# Patient Record
Sex: FEMALE
[De-identification: names, ages, dates, MRNs, and addresses within clinical notes are randomized per-mention and may not be internally consistent; named-entity substitution may affect disease eponyms.]

---

## 2017-03-15 ENCOUNTER — HOSPITAL ENCOUNTER (EMERGENCY)
Dept: HOSPITAL 14 - H.ER | Age: 47
Discharge: HOME | End: 2017-03-15
Payer: COMMERCIAL

## 2017-03-15 VITALS — RESPIRATION RATE: 20 BRPM

## 2017-03-15 VITALS — TEMPERATURE: 98.6 F

## 2017-03-15 VITALS — HEART RATE: 77 BPM

## 2017-03-15 VITALS — OXYGEN SATURATION: 99 %

## 2017-03-15 VITALS — DIASTOLIC BLOOD PRESSURE: 70 MMHG | SYSTOLIC BLOOD PRESSURE: 130 MMHG
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
## 2017-03-15 DIAGNOSIS — S82.892A: Primary | ICD-10-CM
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
## 2017-03-15 LAB
ALBUMIN/GLOB SERPL: 1.4 {RATIO} (ref 1–2.1)
ALP SERPL-CCNC: 54 U/L (ref 38–126)
ALT SERPL-CCNC: 21 U/L (ref 9–52)
APTT BLD: 24.5 SECONDS (ref 23.3–32.5)
AST SERPL-CCNC: 17 U/L (ref 14–36)
BASOPHILS # BLD AUTO: 0.1 K/UL (ref 0–0.2)
BASOPHILS NFR BLD: 0.8 % (ref 0–2)
BILIRUB SERPL-MCNC: 0.6 MG/DL (ref 0.2–1.3)
BUN SERPL-MCNC: 11 MG/DL (ref 7–17)
CALCIUM SERPL-MCNC: 9.2 MG/DL (ref 8.4–10.2)
CHLORIDE SERPL-SCNC: 106 MMOL/L (ref 98–107)
CO2 SERPL-SCNC: 19 MMOL/L (ref 22–30)
EOSINOPHIL # BLD AUTO: 0.8 K/UL (ref 0–0.7)
EOSINOPHIL NFR BLD: 5.3 % (ref 0–4)
ERYTHROCYTE [DISTWIDTH] IN BLOOD BY AUTOMATED COUNT: 11.8 % (ref 11.5–14.5)
GLOBULIN SER-MCNC: 3.1 GM/DL (ref 2.2–3.9)
GLUCOSE SERPL-MCNC: 155 MG/DL (ref 65–105)
HCT VFR BLD CALC: 38.3 % (ref 34–47)
LYMPHOCYTES # BLD AUTO: 2.8 K/UL (ref 1–4.3)
LYMPHOCYTES NFR BLD AUTO: 19.6 % (ref 20–40)
MCH RBC QN AUTO: 33 PG (ref 27–31)
MCHC RBC AUTO-ENTMCNC: 33.7 G/DL (ref 33–37)
MCV RBC AUTO: 97.9 FL (ref 81–99)
MONOCYTES # BLD: 0.8 K/UL (ref 0–0.8)
MONOCYTES NFR BLD: 5.3 % (ref 0–10)
NEUTROPHILS # BLD: 9.9 K/UL (ref 1.8–7)
NEUTROPHILS NFR BLD AUTO: 69 % (ref 50–75)
NRBC BLD AUTO-RTO: 0.1 % (ref 0–0)
PLATELET # BLD: 289 K/UL (ref 130–400)
PMV BLD AUTO: 8.6 FL (ref 7.2–11.7)
POTASSIUM SERPL-SCNC: 3.8 MMOL/L (ref 3.6–5)
PROT SERPL-MCNC: 7.3 G/DL (ref 6.3–8.2)
SODIUM SERPL-SCNC: 141 MMOL/L (ref 132–148)
WBC # BLD AUTO: 14.4 K/UL (ref 4.8–10.8)

## 2017-03-15 PROCEDURE — 96374 THER/PROPH/DIAG INJ IV PUSH: CPT

## 2017-03-15 PROCEDURE — 84703 CHORIONIC GONADOTROPIN ASSAY: CPT

## 2017-03-15 PROCEDURE — 80053 COMPREHEN METABOLIC PANEL: CPT

## 2017-03-15 PROCEDURE — 85610 PROTHROMBIN TIME: CPT

## 2017-03-15 PROCEDURE — 93005 ELECTROCARDIOGRAM TRACING: CPT

## 2017-03-15 PROCEDURE — 73630 X-RAY EXAM OF FOOT: CPT

## 2017-03-15 PROCEDURE — 73610 X-RAY EXAM OF ANKLE: CPT

## 2017-03-15 PROCEDURE — 99283 EMERGENCY DEPT VISIT LOW MDM: CPT

## 2017-03-15 PROCEDURE — 85730 THROMBOPLASTIN TIME PARTIAL: CPT

## 2017-03-15 PROCEDURE — 73600 X-RAY EXAM OF ANKLE: CPT

## 2017-03-15 PROCEDURE — 85025 COMPLETE CBC W/AUTO DIFF WBC: CPT

## 2017-03-15 PROCEDURE — 96375 TX/PRO/DX INJ NEW DRUG ADDON: CPT

## 2017-03-15 PROCEDURE — 73590 X-RAY EXAM OF LOWER LEG: CPT

## 2017-03-15 NOTE — RAD
PROCEDURE:  Left Foot Radiographs.



HISTORY:

fall  L foot ankle trauma  



COMPARISON:

None.



FINDINGS:



BONES:

No fracture identified in the foot.  Medial malleolar and distal 

fibular diaphyseal fractures are noted. 



JOINTS:

Normal. 



SOFT TISSUES:

Normal. 



OTHER FINDINGS:

None.



IMPRESSION:

Unremarkable examination of the left foot. Multiple fractures of the 

left ankle are noted. Please see report of left ankle radiographs.

## 2017-03-15 NOTE — RAD
Left ankle radiographs 



Comparison: Left ankle radiographs performed earlier the same day. 



Impression: Status post left ankle close reduction 



Findings: 



Images are obtained through a cast which obscures osseous detail.  

Trimalleolar fracture deformity is re-identified in anatomic 

alignment, improved since prior study. Soft tissue swelling. 



Impression: 



Interval cast. Trimalleolar fracture deformity appears in improved 

anatomic alignment since prior examination.

## 2017-03-15 NOTE — RAD
PROCEDURE:  Left Ankle Radiographs.



HISTORY:

fall  L foot ankle trauma  



COMPARISON:

None



FINDINGS:



BONES:

Displaced posterior malleolar fracture.  Displaced medial malleolar 

fracture.  Displaced oblique distal fibular fracture above the level 

of the plafond. Talar dome appears smooth. 



JOINTS:

Widened ankle mortise.



SOFT TISSUES:

Only mild soft tissue swelling noted. 



OTHER FINDINGS:

None.



IMPRESSION:

Fracture medial and posterior malleoli with displacement.  Oblique 

displaced distal fibular fracture.  Widened ankle mortise.

## 2017-03-15 NOTE — RAD
PROCEDURE:  Left Ankle Radiographs.



HISTORY:

closed reduction assesment  



COMPARISON:

None



FINDINGS:



BONES:

Status post close reduction left ankle fracture. The medial malleolus 

and distal fibular fragment both appear in closer anatomic alignment. 

. The posterior malleolar fragment appears in anatomic alignment.  

There is likely greater comminution of the distal tibia than is 

clearly evident on plain radiography. 



JOINTS:

The tibiotalar articulation appears grossly intact. The talar dome is 

smooth.



SOFT TISSUES:

Circumferential soft tissue swelling. 



OTHER FINDINGS:

None.



IMPRESSION:

Closedreduction oblique distal fibular diaphysis fracture, medial 

malleolar and posterior malleolar fractures. There is still some 

displacement of the medial malleolar fragment.  There is likely 

greater comminution of the distal tibia than is evident on plain 

radiography.

## 2017-03-15 NOTE — RAD
PROCEDURE:  Radiographs of the left tibia and fibula. 



HISTORY:

fall  L foot ankle trauma



COMPARISON:

None available.



TECHNIQUE:

Frontal and lateral views obtained. 



FINDINGS:



BONES:

Oblique displaced distal fibular fracture. Medial and posterior 

malleolar fractures as reported under radiography of left ankle.  No 

additional fracture identified.



JOINT SPACES:

Unremarkable.



OTHER FINDINGS:

None.



IMPRESSION:

Oblique mildly displaced distal fibular fracture.  Medial and 

posterior malleolar fractures.  Mildly displaced.

## 2017-03-15 NOTE — ED PDOC
Lower Extremity Pain/Injury


Time Seen by Provider: 03/15/17 09:32


Chief Complaint (Nursing): Lower Extremity Problem/Injury


Chief Complaint (Provider): left ankle pain


History Per: Patient, EMS


History/Exam Limitations: no limitations


Onset/Duration Of Symptoms: Hrs (<1)


Current Symptoms Are (Timing): Still Present


Severity: Severe


Additional Complaint(s): 


45yo female states was walking and slipped on ice, injuring left ankle/lower 

leg.  Denies head or neck trauma. Denies upper extremity, chest pain or back 

pain.  Only c/o pain to left ankle with limited active ROM and tingling to 

foot.  Partially immobilized ankle by EMS. Placed onto stretcher, deformity 

noted, ortho workup initiated with immediate ordering of analgesia for relief 

of pain.





Past Medical History


Reviewed: Historical Data, Nursing Documentation, Vital Signs


Vital Signs: 





 Last Vital Signs











Temp  96.8 F L  03/15/17 09:25


 


Pulse  76   03/15/17 09:25


 


Resp  18   03/15/17 09:25


 


BP  143/89   03/15/17 09:25


 


Pulse Ox  99   03/15/17 09:25














- Medical History


PMH: Asthma


Other PMH: breast cancer





- Surgical History


Other surgeries: mastectomy





- Family History


Family History: States: Unknown Family Hx





- Living Arrangements


Living Arrangements: With Family





- Social History


Drugs: Denies





- Home Medications


Home Medications: 


 Ambulatory Orders











 Medication  Instructions  Recorded


 


Albuterol Sulfate [Proair Hfa] 2 puff IH Q6H PRN 06/28/16


 


Fluticasone/Salmeterol 250/50 1 puff IH Q12H 03/15/17





[Advair Diskus 250/50]  


 


Naproxen [Naprosyn] 500 mg PO BID PRN #14 tablet 03/15/17


 


oxyCODONE/Acetaminophen [Percocet 1 ea PO Q6 PRN #12 tab 03/15/17





5/325 mg Tab]  


 


traMADol [Ultram] 50 mg PO TID PRN #12 tab 03/15/17














- Allergies


Allergies/Adverse Reactions: 


 Allergies











Allergy/AdvReac Type Severity Reaction Status Date / Time


 


cephalexin monohydrate Allergy  RASH Verified 03/15/17 09:29





[From Keflex]     


 


codeine Allergy  RASH Verified 03/15/17 09:29


 


Penicillins Allergy  RASH Verified 03/15/17 09:29


 


sulfamethoxazole Allergy  SYNCOPE Verified 03/15/17 09:29





[From Bactrim]     


 


trimethoprim [From Bactrim] Allergy  SYNCOPE Verified 03/15/17 09:29














Review of Systems


Constitutional: Negative for: Fever, Chills


Cardiovascular: Negative for: Chest Pain, Palpitations


Respiratory: Negative for: Cough, Shortness of Breath


Gastrointestinal: Negative for: Nausea, Vomiting


Genitourinary Female: Negative for: Dysuria, Frequency


Musculoskeletal: Positive for: Leg Pain, Foot Pain.  Negative for: Neck Pain, 

Shoulder Pain, Arm Pain, Back Pain, Hand Pain


Skin: Negative for: Rash, Lesions, Jaundice


Neurological: Negative for: Weakness, Numbness





Physical Exam





- Reviewed


Nursing Documentation Reviewed: Yes


Vital Signs Reviewed: Yes





- Physical Exam


Appears: Positive for: Non-toxic, Uncomfortable (painful distress)


Head Exam: Positive for: ATRAUMATIC, NORMAL INSPECTION, NORMOCEPHALIC


Skin: Positive for: Normal Color, Warm, DRY


Eye Exam: Positive for: Normal appearance, EOMI, PERRL.  Negative for: 

Periorbital swelling


ENT: Positive for: Normal ENT Inspection


Neck: Positive for: Normal, Painless ROM


Cardiovascular/Chest: Positive for: Regular Rate, Rhythm


Respiratory: Positive for: Normal Breath Sounds.  Negative for: Respiratory 

Distress


Gastrointestinal/Abdominal: Positive for: Normal Exam, Bowel Sounds, Soft


Back: Positive for: Normal Inspection


Extremity: Positive for: Swelling (L ankle +deformity and tenderness, edema, 

loss ROM active or passive, normal knee no femur/knee or prox fib tenderness), 

Other (neg hip or right leg tenderness)


Neurologic/Psych: Positive for: Alert, Oriented





- Laboratory Results


Result Diagrams: 


 03/15/17 10:20





 03/15/17 10:20





- ECG


ECG: Positive for: Interpreted By Me


ECG Rhythm: Positive for: Sinus Rhythm


Interpretation Of Abn EKG: performed pre-sedation


Rate: 77


O2 Sat by Pulse Oximetry: 99


Pulse Ox Interpretation: Normal





Medical Decision Making


Medical Decision Making: 


"Code ortho" initiated. Ice applied to ankle w stabilization and immobilization.


XRays ordered. Basic labwork initiated.  NPO.





1000a


XRays reveal trimal fracture with dislocation on my initial review.


Podiatry consulted. 





Labs unremarkable


States had normal CXR recently via PMD





Procedure note:  Reduction and procedural sedation


Indication: tri-mal fx with fib spiral fracture and displacement


Consent obtained for closed reduction under procedural sedation.


Risks benefits alternatives explained.


Time out performed. ID, site, allergy and NPO confirmed.


Using ketamine and propofol, excellent sedation obtained- SPO2 >96% throughout 

procedure, ETCO2 32-38 with excellent waveform.


Dr Nagel and podiatry team provided procedure, traction with real time XRay, 

improved alignment of fracture.


Cast applied by podiatry team, toes remain pink and mobile. 


Tolerated well. MD in room total 35minutes. 


Monitored ~90min post procedure with +urination, tolerating PO.


Pain medicine regimen explained (pt has taken percocet in past without allergy 

as recently as last year), elevate, hold NSAIDS 48hrs prior to surgery, now 

scheduled for Friday.


Followup Dr Bates.


Instructions for post deep sedation given. 








Disposition





- Clinical Impression


Clinical Impression: 


 Ankle fracture





- Patient ED Disposition


Is Patient to be Admitted: No


Counseled Patient/Family Regarding: Studies Performed, Diagnosis, Need For 

Followup, Rx Given





- Disposition


Referrals: 


Podiatry Clinic [Outside]


Disposition: Routine/Home


Disposition Time: 15:30


Condition: STABLE


Additional Instructions: 


Non-weight bearing to left ankle/foot.  Use crutches.  You will need surgery 

for this injury.  Call Dr Bates tomorrow for appointment soon to arrange.


Take pain medication as directed.  Recommend liquid diet tonight after 

sedation. Have family member stay with you tonight.  


Prescriptions: 


Naproxen [Naprosyn] 500 mg PO BID PRN #14 tablet


 PRN Reason: Pain, Moderate (4-7)


oxyCODONE/Acetaminophen [Percocet 5/325 mg Tab] 1 ea PO Q6 PRN #12 tab


 PRN Reason: Pain, Severe (8-10)


traMADol [Ultram] 50 mg PO TID PRN #12 tab


 PRN Reason: Pain, Moderate (4-7)


Instructions:  Ankle Fracture (ED), Cast Care (ED), Crutch Instructions (ED), 

Deep Sedation (ED), RICE Therapy (ED), Non Weight Bearing Activity (ED)





- POA


Present On Arrival: Falls Or Trauma

## 2017-03-16 NOTE — CARD
--------------- APPROVED REPORT --------------





EKG Measurement

Heart [id number]

FL [id number]

[id number]

[id number]

[id number]



<Conclusion>

Normal sinus rhythm

Possible Left atrial enlargement

Borderline ECG

## 2018-02-18 ENCOUNTER — HOSPITAL ENCOUNTER (EMERGENCY)
Dept: HOSPITAL 14 - H.ER | Age: 48
Discharge: HOME | End: 2018-02-18
Payer: COMMERCIAL

## 2018-02-18 VITALS — OXYGEN SATURATION: 96 %

## 2018-02-18 VITALS — HEART RATE: 98 BPM | DIASTOLIC BLOOD PRESSURE: 69 MMHG | RESPIRATION RATE: 18 BRPM | SYSTOLIC BLOOD PRESSURE: 119 MMHG

## 2018-02-18 VITALS — TEMPERATURE: 98.3 F

## 2018-02-18 DIAGNOSIS — J45.901: Primary | ICD-10-CM

## 2018-02-18 DIAGNOSIS — Z88.0: ICD-10-CM

## 2018-02-18 LAB
ALBUMIN SERPL-MCNC: 4.2 G/DL (ref 3.5–5)
ALBUMIN/GLOB SERPL: 1.3 {RATIO} (ref 1–2.1)
ALT SERPL-CCNC: 29 U/L (ref 9–52)
AST SERPL-CCNC: 28 U/L (ref 14–36)
BASOPHILS # BLD AUTO: 0.1 K/UL (ref 0–0.2)
BASOPHILS NFR BLD: 0.6 % (ref 0–2)
BUN SERPL-MCNC: 13 MG/DL (ref 7–17)
CALCIUM SERPL-MCNC: 9.3 MG/DL (ref 8.4–10.2)
EOSINOPHIL # BLD AUTO: 0.6 K/UL (ref 0–0.7)
EOSINOPHIL NFR BLD: 2.9 % (ref 0–4)
ERYTHROCYTE [DISTWIDTH] IN BLOOD BY AUTOMATED COUNT: 11.8 % (ref 11.5–14.5)
GFR NON-AFRICAN AMERICAN: > 60
HGB BLD-MCNC: 13.3 G/DL (ref 12–16)
LYMPHOCYTES # BLD AUTO: 1.2 K/UL (ref 1–4.3)
LYMPHOCYTES NFR BLD AUTO: 6.2 % (ref 20–40)
MCH RBC QN AUTO: 32.6 PG (ref 27–31)
MCHC RBC AUTO-ENTMCNC: 33.3 G/DL (ref 33–37)
MCV RBC AUTO: 98 FL (ref 81–99)
MONOCYTES # BLD: 0.4 K/UL (ref 0–0.8)
MONOCYTES NFR BLD: 1.9 % (ref 0–10)
NEUTROPHILS # BLD: 17.4 K/UL (ref 1.8–7)
NEUTROPHILS NFR BLD AUTO: 88.4 % (ref 50–75)
NRBC BLD AUTO-RTO: 0.1 % (ref 0–0)
PLATELET # BLD: 280 K/UL (ref 130–400)
PMV BLD AUTO: 8 FL (ref 7.2–11.7)
RBC # BLD AUTO: 4.08 MIL/UL (ref 3.8–5.2)
WBC # BLD AUTO: 19.7 K/UL (ref 4.8–10.8)

## 2018-02-18 NOTE — ED PDOC
HPI: SOB/CHF/COPD


Time Seen by Provider: 02/18/18 21:32


Chief Complaint (Nursing): Shortness Of Breath


Additional Complaint(s): 





48 yo female pmhx asthma brought in by EMS to [facility] ED w/ c/o wheezing and chest 

tightness since this AM. Pt reports she has URI symptoms ( cough and nasal 

congestion) for 1.5 weeks. Pt was seen by her PCP/Pulmonologist Dr. Mcgregor on 

2/15/18, was given prednisone taper and had negative CXR in the office. Pt 

reports she took prednisone 40 mg today at 5:45 pm ( first dose) and took 3 

nebulizer treatment since this morning. States her chest tightness and 

shortness of breath were not improving so see came to ED.  Denies chest pain, 

dizziness, fever, chills, GI or  symptoms. Denies any hx hospitalization or 

intubation due to asthma but had ED visit for asthma in the past. Quit smoking 

5 yrs ago( smoked 1/2 PPD X 15 yrs).








PMD/Pulmonologist: Dr. Mcgregor





  





Past Medical History


Vital Signs: 


 Last Vital Signs











Temp  98.3 F   02/18/18 20:45


 


Pulse  98 H  02/18/18 23:24


 


Resp  18   02/18/18 23:24


 


BP  119/69   02/18/18 23:24


 


Pulse Ox  95   02/18/18 23:24














- Medical History


PMH: Asthma





- Surgical History


Other surgeries: Ankle surgery





- Family History


Family History: States: No Known Family Hx





- Social History


Ex-Smoker (has not smoked in the last 12 months): Yes


Alcohol: None


Drugs: Denies





- Home Medications


Home Medications: 


 Ambulatory Orders











 Medication  Instructions  Recorded


 


Albuterol Sulfate [Proair Hfa] 2 puff IH Q6H PRN 06/28/16


 


Fluticasone/Salmeterol 250/50 1 puff IH Q12H 03/15/17





[Advair Diskus 250/50]  


 


Naproxen [Naprosyn] 500 mg PO BID PRN #14 tablet 03/15/17


 


oxyCODONE/Acetaminophen [Percocet 1 ea PO Q6 PRN #12 tab 03/15/17





5/325 mg Tab]  


 


traMADol [Ultram] 50 mg PO TID PRN #12 tab 03/15/17














- Allergies


Allergies/Adverse Reactions: 


 Allergies











Allergy/AdvReac Type Severity Reaction Status Date / Time


 


cephalexin monohydrate Allergy  RASH Verified 03/15/17 09:29





[From Keflex]     


 


codeine Allergy  RASH Verified 03/15/17 09:29


 


Penicillins Allergy  RASH Verified 03/15/17 09:29


 


sulfamethoxazole Allergy  SYNCOPE Verified 03/15/17 09:29





[From Bactrim]     


 


trimethoprim [From Bactrim] Allergy  SYNCOPE Verified 03/15/17 09:29














Review of Systems


Constitutional: Negative for: Fever, Chills


ENT: Negative for: Ear Pain, Throat Pain


Cardiovascular: Negative for: Chest Pain, Palpitations


Respiratory: Positive for: Cough, Shortness of Breath, Wheezing


Gastrointestinal: Negative for: Nausea, Vomiting, Abdominal Pain


Genitourinary Female: Negative for: Dysuria


Skin: Negative for: Rash


Neurological: Negative for: Headache, Dizziness





Physical Exam





- Physical Exam


Appears: Positive for: Non-toxic, In Acute Distress (in mild respiratory 

distress but able to talk in complete sentences. )


Head Exam: Positive for: ATRAUMATIC, NORMOCEPHALIC


Skin: Positive for: Normal Color.  Negative for: Diaphoresis


ENT: Positive for: Normal ENT Inspection


Neck: Positive for: Normal


Cardiovascular/Chest: Positive for: Tachycardia (slight tachycardia w/ regular 

rhythm. )


Respiratory: Positive for: Wheezing (diffuse wheezing B/L lung fields prominent 

in expiratory phase. ).  Negative for: Accessory Muscle Use, Crackles, Rales


Gastrointestinal/Abdominal: Positive for: Bowel Sounds, Soft.  Negative for: 

Tenderness


Neurologic/Psych: Positive for: Alert, Oriented





- Laboratory Results


Result Diagrams: 


 02/18/18 22:23





 02/18/18 22:23





- ECG


O2 Sat by Pulse Oximetry: 96





- Progress


ED Course And Treament: 





Assessment: 





48 yo female pmhx asthma brought in by EMS to [facility] ED with symptoms of asthma 

exacerbation.





Plan:





DuoNeb x 3 


Pt took 40 mg prednisone at 5:45 pm this evening at home.


Peak flow pre/post tx


CBC


CMP


Urine hCG.





If symptoms do not improved after 3 treatment, will consider mag sulfate.








Case d/w ED attending Dr. Coleman.





Re-evaluation at 11:00 pm





After three dueneb tx, pt feels significantly better. Pt's wheezing has 

improved but residual B/L expiratory wheezing remains. Pt declines to have any 

IV medication ( i.e. mag sulfate). Pt is advised to complete prednisone course 

as prescribed by her Pulmonologist. Pt is medically stable to discharge home. 

Advised to return to ED if symptoms worsens. Pt verbalizes the understanding 

and agrees with the plan. 








Case d/w ED attending Dr. Coleman














   





Disposition





- Clinical Impression


Clinical Impression: 


 Asthma exacerbation








- Disposition


Disposition Time: 23:36


Condition: IMPROVED


Instructions:  Asthma in Adults


Forms:  [facility] (English)

## 2018-02-19 LAB
EOSINOPHIL NFR BLD: 4 % (ref 0–7)
LYMPHOCYTE: 6 % (ref 20–50)
MONOCYTE: 0 % (ref 0–10)
NEUTROPHILS NFR BLD AUTO: 90 % (ref 42–75)
PLATELET # BLD EST: NORMAL 10*3/UL
TEARDROP CELLS: SLIGHT
TOTAL CELLS COUNTED BLD: 100

## 2018-04-12 ENCOUNTER — HOSPITAL ENCOUNTER (EMERGENCY)
Dept: HOSPITAL 14 - H.ER | Age: 48
Discharge: HOME | End: 2018-04-12
Payer: COMMERCIAL

## 2018-04-12 VITALS
SYSTOLIC BLOOD PRESSURE: 128 MMHG | OXYGEN SATURATION: 100 % | DIASTOLIC BLOOD PRESSURE: 78 MMHG | HEART RATE: 73 BPM | RESPIRATION RATE: 14 BRPM

## 2018-04-12 VITALS — TEMPERATURE: 97.6 F

## 2018-04-12 VITALS — BODY MASS INDEX: 20.7 KG/M2

## 2018-04-12 DIAGNOSIS — J45.909: ICD-10-CM

## 2018-04-12 DIAGNOSIS — R07.89: Primary | ICD-10-CM

## 2018-04-12 DIAGNOSIS — Z88.0: ICD-10-CM

## 2018-04-12 LAB
ALBUMIN SERPL-MCNC: 4.4 G/DL (ref 3.5–5)
ALBUMIN/GLOB SERPL: 1.4 {RATIO} (ref 1–2.1)
ALT SERPL-CCNC: 29 U/L (ref 9–52)
AST SERPL-CCNC: 19 U/L (ref 14–36)
BASOPHILS # BLD AUTO: 0.1 K/UL (ref 0–0.2)
BASOPHILS NFR BLD: 1 % (ref 0–2)
BUN SERPL-MCNC: 10 MG/DL (ref 7–17)
CALCIUM SERPL-MCNC: 9.9 MG/DL (ref 8.4–10.2)
EOSINOPHIL # BLD AUTO: 0.9 K/UL (ref 0–0.7)
EOSINOPHIL NFR BLD: 9 % (ref 0–4)
ERYTHROCYTE [DISTWIDTH] IN BLOOD BY AUTOMATED COUNT: 12.3 % (ref 11.5–14.5)
GFR NON-AFRICAN AMERICAN: > 60
HGB BLD-MCNC: 13.8 G/DL (ref 12–16)
LYMPHOCYTES # BLD AUTO: 2.1 K/UL (ref 1–4.3)
LYMPHOCYTES NFR BLD AUTO: 22 % (ref 20–40)
MCH RBC QN AUTO: 33 PG (ref 27–31)
MCHC RBC AUTO-ENTMCNC: 33.9 G/DL (ref 33–37)
MCV RBC AUTO: 97.4 FL (ref 81–99)
MONOCYTES # BLD: 0.6 K/UL (ref 0–0.8)
MONOCYTES NFR BLD: 5.8 % (ref 0–10)
NEUTROPHILS # BLD: 6 K/UL (ref 1.8–7)
NEUTROPHILS NFR BLD AUTO: 62.2 % (ref 50–75)
NRBC BLD AUTO-RTO: 0.1 % (ref 0–0)
PLATELET # BLD: 314 K/UL (ref 130–400)
PMV BLD AUTO: 7.8 FL (ref 7.2–11.7)
RBC # BLD AUTO: 4.2 MIL/UL (ref 3.8–5.2)
WBC # BLD AUTO: 9.6 K/UL (ref 4.8–10.8)

## 2018-04-12 NOTE — ED PDOC
HPI: Chest Pain


Time Seen by Provider: 04/12/18 10:10


History Per: Patient


Onset/Duration Of Symptoms: Hrs (2)


Current Symptoms Are (Timing): Still Present


Severity: Moderate


Pain Scale Rating Of: 3


Quality: Sharp


Associated Symptoms: Diaphoresis


Modifying Factors: None


Exacerbating Factors: None


Alleviating Factors: None


Additional Complaint(s): 





Sharp left sided chest pain radiating to neck and left shoulder since this AM. 

Denies SOB. No weakness or parasthesias. Denies cough or fever. No leg or calf 

pain





Past Medical History


Vital Signs: 


 Last Vital Signs











Temp  97.6 F   04/12/18 10:11


 


Pulse  76   04/12/18 10:54


 


Resp  16   04/12/18 10:11


 


BP  153/88 H  04/12/18 10:11


 


Pulse Ox  99   04/12/18 10:30














- Medical History


PMH: Asthma





- Surgical History


Other surgeries: Left ankle surgery.





- Family History


Family History: States: Unknown Family Hx





- Home Medications


Home Medications: 


 Ambulatory Orders











 Medication  Instructions  Recorded


 


Albuterol Sulfate [Proair Hfa] 2 puff IH Q6H PRN 06/28/16


 


Fluticasone/Salmeterol 250/50 1 puff IH Q12H 03/15/17





[Advair Diskus 250/50]  


 


Naproxen [Naprosyn] 500 mg PO BID PRN #14 tablet 03/15/17


 


oxyCODONE/Acetaminophen [Percocet 1 ea PO Q6 PRN #12 tab 03/15/17





5/325 mg Tab]  


 


traMADol [Ultram] 50 mg PO TID PRN #12 tab 03/15/17














- Allergies


Allergies/Adverse Reactions: 


 Allergies











Allergy/AdvReac Type Severity Reaction Status Date / Time


 


cephalexin monohydrate Allergy  RASH Verified 04/12/18 10:45





[From Keflex]     


 


codeine Allergy  RASH Verified 04/12/18 10:45


 


Penicillins Allergy  RASH Verified 04/12/18 10:45


 


sulfamethoxazole Allergy  SYNCOPE Verified 04/12/18 10:45





[From Bactrim]     


 


trimethoprim [From Bactrim] Allergy  SYNCOPE Verified 04/12/18 10:45














Review of Systems


ROS Statement: Except As Marked, All Systems Reviewed And Found Negative


Cardiovascular: Positive for: Chest Pain





Physical Exam





- Reviewed


Nursing Documentation Reviewed: Yes


Vital Signs Reviewed: Yes





- Physical Exam


Appears: Positive for: Non-toxic, No Acute Distress


Head Exam: Positive for: ATRAUMATIC, NORMAL INSPECTION, NORMOCEPHALIC


Skin: Positive for: Normal Color, Warm, DRY


Eye Exam: Positive for: EOMI, Normal appearance, PERRL


ENT: Positive for: Normal ENT Inspection


Neck: Positive for: Normal, Painless ROM


Cardiovascular/Chest: Positive for: Regular Rate, Rhythm


Respiratory: Positive for: CNT, Normal Breath Sounds


Gastrointestinal/Abdominal: Positive for: Normal Exam, Soft


Back: Positive for: Normal Inspection


Extremity: Positive for: Normal ROM.  Negative for: Calf Tenderness, Swelling


Neurologic/Psych: Positive for: Alert, Oriented





- Laboratory Results


Result Diagrams: 


 04/12/18 11:00





 04/12/18 11:00





- ECG


O2 Sat by Pulse Oximetry: 99





Medical Decision Making


Medical Decision Making: 





Pt declines chest xray due to fear of excessive radiation. 


Advised pt that she is at risk for PE due to h/o Carcinoma in situ of breast. 

Pt advised to have CTA chest to r/o PE despite nl d-dimer. Aware of risks 

including pulmonary infarct an death. Advised to return immediately to ED if 

chest pain or SOB recurrs. Advised f/u PMD ASAP today if possible.





Disposition





- Clinical Impression


Clinical Impression: 


 Chest pain








- Patient ED Disposition


Is Patient to be Admitted: No


Counseled Patient/Family Regarding: Studies Performed, Diagnosis, Need For 

Followup





- Disposition


Referrals: 


[facility] ED [Outside]


Disposition: Routine/Home


Disposition Time: 12:27


Condition: FAIR


Instructions:  Chest Pain

## 2018-04-12 NOTE — CARD
--------------- APPROVED REPORT --------------





EKG Measurement

Heart [id number]

NC [id number]

[id number]

[id number]

[id number]



<Conclusion>

Normal sinus rhythm

Normal ECG

## 2019-01-18 NOTE — CP.PCM.CON
History of Present Illness





- History of Present Illness


History of Present Illness: 


PODIATRY CONSULT NOTE





CC: Left ankle trauma





HPI: 46 year old female with left ankle pain and swelling beginning 1 hour ago 

secondary to a fall on ice today. Pt reports immediately after she was unable 

to bear weight on the right side. Pt can to the ED department immediately to 

seek medical treatment. Pt reports pain is a throbbing, localized 7/10 to the 

affected ankle. Pain is controlled at this time due to NSAID being administered 

upon arrival to ED. 





PMH: Asthma


PSH: Rigth side mastectomy, Tonsillectomy, Cesarian birth. 


ALL: Penicillin, Keflex, Codeine, Bactrim, Sulfa drugs, Morphine


Family Hx: 


Social Hx: Employed, homed. Admits to smoking 3/4 of a pack daily with a 20 

year smoking history. Denies alcohol abuse, and illicit drug use. 


Meds: Advir, Albuterol





ROS: Reviewed and all systems negative outside HPI.








Review of Systems





- Review of Systems


All systems: reviewed and no additional remarkable complaints except





Past Patient History





- Past Social History


Drugs: Denies





- PULMONARY


Hx Asthma: Yes





- PSYCHIATRIC


Hx Substance Use: No





- SURGICAL HISTORY


Hx Surgeries: Yes


Hx Mastectomy: Yes (RIGHT)





- ANESTHESIA


Hx Anesthesia: Yes


Hx Anesthesia Reactions: No





Meds


Home Medications: 


 Home Medication List











 Medication  Instructions  Recorded  Confirmed  Type


 


Naproxen [Naprosyn] 500 mg PO BID PRN #14 tablet 03/15/17  Rx


 


oxyCODONE/Acetaminophen [Percocet 1 ea PO Q6 PRN #12 tab 03/15/17  Rx





5/325 mg Tab]    


 


traMADol [Ultram] 50 mg PO TID PRN #12 tab 03/15/17  Rx











Allergies/Adverse Reactions: 


 Allergies











Allergy/AdvReac Type Severity Reaction Status Date / Time


 


cephalexin monohydrate Allergy  RASH Verified 03/15/17 09:29





[From Keflex]     


 


codeine Allergy  RASH Verified 03/15/17 09:29


 


Penicillins Allergy  RASH Verified 03/15/17 09:29


 


sulfamethoxazole Allergy  SYNCOPE Verified 03/15/17 09:29





[From Bactrim]     


 


trimethoprim [From Bactrim] Allergy  SYNCOPE Verified 03/15/17 09:29














Physical Exam





- Constitutional


Appears: Well, Non-toxic, No Acute Distress





- Extremities Exam


Additional comments: 


Left lower extremity focused. 





VASC: DP and PT pulses fully palpable, graded 1/4, secondary to non-pitting 

edema noted extending from supra-ankle level distally into forefoot. No callor 

noted. Pedal hair growth noted at level of digits. 





DERM: No ecchymosis, vesicle, open wounds, or macerations noted. Nails are 

normotrophic and well cared for. 





NERUO: Protective sensation is grossly intact. 





MUSCK: Lateral and medial malleoli are tender to palpation. Mild dislocation of 

left anklel grossly appreciated visually, dislocated laterally and posteriorly.

  ROM limited due to guarding. No gross dislocation of deformity noted. 





- Neurological Exam


Neurological exam: Alert, Oriented x3





- Psychiatric Exam


Psychiatric exam: Anxious, Normal Affect





- Skin


Skin Exam: Intact, Normal Color, Warm





Results





- Vital Signs


Recent Vital Signs: 


 Last Vital Signs











Temp  96.8 F L  03/15/17 09:25


 


Pulse  76   03/15/17 09:25


 


Resp  18   03/15/17 09:25


 


BP  143/89   03/15/17 09:25


 


Pulse Ox  99   03/15/17 10:31














- Labs


Result Diagrams: 


 03/15/17 10:20





 03/15/17 10:20


Labs: 


 Laboratory Results - last 24 hr











  03/15/17





  10:20


 


WBC  14.4 H


 


RBC  3.91


 


Hgb  12.9


 


Hct  38.3


 


MCV  97.9  D


 


MCH  33.0 H


 


MCHC  33.7


 


RDW  11.8


 


Plt Count  289


 


MPV  8.6


 


Neut % (Auto)  69.0


 


Lymph % (Auto)  19.6 L


 


Mono % (Auto)  5.3


 


Eos % (Auto)  5.3 H


 


Baso % (Auto)  0.8


 


Neut #  9.9 H


 


Lymph #  2.8


 


Mono #  0.8


 


Eos #  0.8 H


 


Baso #  0.1


 


Sodium  141


 


Potassium  3.8


 


Chloride  106


 


Carbon Dioxide  19 L


 


Anion Gap  20


 


BUN  11


 


Creatinine  0.5 L


 


Est GFR ( Amer)  > 60


 


Est GFR (Non-Af Amer)  > 60


 


Random Glucose  155 H


 


Calcium  9.2


 


Total Bilirubin  0.6


 


AST  17


 


ALT  21


 


Alkaline Phosphatase  54


 


Total Protein  7.3


 


Albumin  4.2


 


Globulin  3.1


 


Albumin/Globulin Ratio  1.4














Assessment & Plan





- Assessment and Plan (Free Text)


Assessment: 


46 year old female with 1) Left ankle closed, displaced Tri-malleolar fracture, 

SER-4 type, secondary to acute fall trauma.  . 


Plan: 


Patient evaluated and treated. Chart labs and vitals reviewed. Discussed with 

attending Dr. Bates. 


Discussed with patient etiology and treatment prognosis of conservative and 

surgical treatment options. Due to patient's extensive fracture patterns 

surgical intervention recommended, with closed reduction to be performed in the 

ER. Pt is aware of risks, benefits, and complications. 


-Consented patient for closed reduction under sedation. Performed closed 

reduction, adequate reduction of dislocations, re-approximation of length of 

limb, and bone apposition. Pt tolerated procedure well. Bi-valved cast used for 

eternal fixation. 


-Pt to be non-weightbearing w/ use of crutches.


-Pt to be scheduled for surgery within 1 week. 


-Pt to follow-up in office w / Dr. Bates. 


 





- Date & Time


Date: 03/15/17


Time: 01:00
Quality 130: Documentation Of Current Medications In The Medical Record: Current Medications with Name, Dosage, Frequency, or Route not Documented, Reason not Given
Quality 226: Preventive Care And Screening: Tobacco Use: Screening And Cessation Intervention: Patient screened for tobacco use, is a smoker AND received Cessation Counseling
Quality 402: Tobacco Use And Help With Quitting Among Adolescents: Patient screened for tobacco and is a smoker AND received Cessation Counseling
Quality 431: Preventive Care And Screening: Unhealthy Alcohol Use - Screening: Patient screened for unhealthy alcohol use using a single question and scores less than 2 times per year
Detail Level: Detailed